# Patient Record
Sex: MALE | Race: BLACK OR AFRICAN AMERICAN | NOT HISPANIC OR LATINO | Employment: FULL TIME | URBAN - METROPOLITAN AREA
[De-identification: names, ages, dates, MRNs, and addresses within clinical notes are randomized per-mention and may not be internally consistent; named-entity substitution may affect disease eponyms.]

---

## 2019-07-20 ENCOUNTER — HOSPITAL ENCOUNTER (INPATIENT)
Facility: HOSPITAL | Age: 34
LOS: 1 days | Discharge: HOME | DRG: 683 | End: 2019-07-21
Attending: EMERGENCY MEDICINE | Admitting: EMERGENCY MEDICINE
Payer: MEDICAID

## 2019-07-20 DIAGNOSIS — N17.9 AKI (ACUTE KIDNEY INJURY): Primary | ICD-10-CM

## 2019-07-20 DIAGNOSIS — E86.0 DEHYDRATION: ICD-10-CM

## 2019-07-20 DIAGNOSIS — N30.01 ACUTE CYSTITIS WITH HEMATURIA: ICD-10-CM

## 2019-07-20 DIAGNOSIS — E87.5 HYPERKALEMIA: ICD-10-CM

## 2019-07-20 PROBLEM — R73.9 HYPERGLYCEMIA: Status: ACTIVE | Noted: 2019-07-20

## 2019-07-20 PROBLEM — E66.9 OBESITY: Status: ACTIVE | Noted: 2019-07-20

## 2019-07-20 PROBLEM — M62.82 RHABDOMYOLYSIS: Status: ACTIVE | Noted: 2019-07-20

## 2019-07-20 PROBLEM — E83.52 HYPERCALCEMIA: Status: ACTIVE | Noted: 2019-07-20

## 2019-07-20 LAB
ALBUMIN SERPL BCP-MCNC: 5.1 G/DL (ref 3.5–5.2)
ALP SERPL-CCNC: 144 U/L (ref 55–135)
ALT SERPL W/O P-5'-P-CCNC: 35 U/L (ref 10–44)
ANION GAP SERPL CALC-SCNC: 19 MMOL/L (ref 8–16)
AST SERPL-CCNC: 24 U/L (ref 10–40)
BACTERIA #/AREA URNS HPF: ABNORMAL /HPF
BASOPHILS # BLD AUTO: 0 K/UL (ref 0–0.2)
BASOPHILS NFR BLD: 0 % (ref 0–1.9)
BILIRUB SERPL-MCNC: 0.4 MG/DL (ref 0.1–1)
BILIRUB UR QL STRIP: NEGATIVE
BUN SERPL-MCNC: 39 MG/DL (ref 6–20)
CALCIUM SERPL-MCNC: 11.3 MG/DL (ref 8.7–10.5)
CHLORIDE SERPL-SCNC: 94 MMOL/L (ref 95–110)
CHLORIDE UR-SCNC: <20 MMOL/L (ref 25–200)
CK SERPL-CCNC: 397 U/L (ref 20–200)
CLARITY UR: ABNORMAL
CO2 SERPL-SCNC: 22 MMOL/L (ref 23–29)
COLOR UR: YELLOW
CREAT SERPL-MCNC: 3.8 MG/DL (ref 0.5–1.4)
CREAT UR-MCNC: 341.9 MG/DL (ref 23–375)
DIFFERENTIAL METHOD: ABNORMAL
EOSINOPHIL # BLD AUTO: 0 K/UL (ref 0–0.5)
EOSINOPHIL NFR BLD: 0 % (ref 0–8)
ERYTHROCYTE [DISTWIDTH] IN BLOOD BY AUTOMATED COUNT: 14 % (ref 11.5–14.5)
EST. GFR  (AFRICAN AMERICAN): 23 ML/MIN/1.73 M^2
EST. GFR  (NON AFRICAN AMERICAN): 19 ML/MIN/1.73 M^2
GLUCOSE SERPL-MCNC: 129 MG/DL (ref 70–110)
GLUCOSE UR QL STRIP: NEGATIVE
GRAN CASTS #/AREA URNS LPF: 3 /LPF
HCT VFR BLD AUTO: 50.8 % (ref 40–54)
HGB BLD-MCNC: 17 G/DL (ref 14–18)
HGB UR QL STRIP: ABNORMAL
HYALINE CASTS #/AREA URNS LPF: 40 /LPF
KETONES UR QL STRIP: NEGATIVE
LEUKOCYTE ESTERASE UR QL STRIP: ABNORMAL
LYMPHOCYTES # BLD AUTO: 1.3 K/UL (ref 1–4.8)
LYMPHOCYTES NFR BLD: 10.1 % (ref 18–48)
MAGNESIUM SERPL-MCNC: 2.7 MG/DL (ref 1.6–2.6)
MCH RBC QN AUTO: 28.7 PG (ref 27–31)
MCHC RBC AUTO-ENTMCNC: 33.5 G/DL (ref 32–36)
MCV RBC AUTO: 86 FL (ref 82–98)
MICROSCOPIC COMMENT: ABNORMAL
MONOCYTES # BLD AUTO: 0.9 K/UL (ref 0.3–1)
MONOCYTES NFR BLD: 7.1 % (ref 4–15)
NEUTROPHILS # BLD AUTO: 10.7 K/UL (ref 1.8–7.7)
NEUTROPHILS NFR BLD: 83 % (ref 38–73)
NITRITE UR QL STRIP: NEGATIVE
PH UR STRIP: 5 [PH] (ref 5–8)
PLATELET # BLD AUTO: 302 K/UL (ref 150–350)
PMV BLD AUTO: 10.6 FL (ref 9.2–12.9)
POTASSIUM SERPL-SCNC: 5.2 MMOL/L (ref 3.5–5.1)
POTASSIUM UR-SCNC: 89 MMOL/L (ref 15–95)
PROT SERPL-MCNC: 11.1 G/DL (ref 6–8.4)
PROT UR QL STRIP: ABNORMAL
RBC # BLD AUTO: 5.92 M/UL (ref 4.6–6.2)
RBC #/AREA URNS HPF: 12 /HPF (ref 0–4)
SODIUM SERPL-SCNC: 135 MMOL/L (ref 136–145)
SODIUM UR-SCNC: 71 MMOL/L (ref 20–250)
SP GR UR STRIP: 1.01 (ref 1–1.03)
UNIDENT CRYS URNS QL MICRO: ABNORMAL
URATE CRY URNS QL MICRO: ABNORMAL
URN SPEC COLLECT METH UR: ABNORMAL
UROBILINOGEN UR STRIP-ACNC: NEGATIVE EU/DL
WBC # BLD AUTO: 12.91 K/UL (ref 3.9–12.7)
WBC #/AREA URNS HPF: 35 /HPF (ref 0–5)

## 2019-07-20 PROCEDURE — 93005 ELECTROCARDIOGRAM TRACING: CPT

## 2019-07-20 PROCEDURE — 82436 ASSAY OF URINE CHLORIDE: CPT

## 2019-07-20 PROCEDURE — 81000 URINALYSIS NONAUTO W/SCOPE: CPT

## 2019-07-20 PROCEDURE — 93010 ELECTROCARDIOGRAM REPORT: CPT | Mod: ,,, | Performed by: INTERNAL MEDICINE

## 2019-07-20 PROCEDURE — 63600175 PHARM REV CODE 636 W HCPCS: Performed by: NURSE PRACTITIONER

## 2019-07-20 PROCEDURE — 25000003 PHARM REV CODE 250: Performed by: NURSE PRACTITIONER

## 2019-07-20 PROCEDURE — 85025 COMPLETE CBC W/AUTO DIFF WBC: CPT

## 2019-07-20 PROCEDURE — 11000001 HC ACUTE MED/SURG PRIVATE ROOM

## 2019-07-20 PROCEDURE — 82570 ASSAY OF URINE CREATININE: CPT

## 2019-07-20 PROCEDURE — 80053 COMPREHEN METABOLIC PANEL: CPT

## 2019-07-20 PROCEDURE — 93010 EKG 12-LEAD: ICD-10-PCS | Mod: ,,, | Performed by: INTERNAL MEDICINE

## 2019-07-20 PROCEDURE — 36415 COLL VENOUS BLD VENIPUNCTURE: CPT

## 2019-07-20 PROCEDURE — 87086 URINE CULTURE/COLONY COUNT: CPT

## 2019-07-20 PROCEDURE — 83036 HEMOGLOBIN GLYCOSYLATED A1C: CPT

## 2019-07-20 PROCEDURE — 87491 CHLMYD TRACH DNA AMP PROBE: CPT

## 2019-07-20 PROCEDURE — 82550 ASSAY OF CK (CPK): CPT

## 2019-07-20 PROCEDURE — 96374 THER/PROPH/DIAG INJ IV PUSH: CPT

## 2019-07-20 PROCEDURE — 96375 TX/PRO/DX INJ NEW DRUG ADDON: CPT

## 2019-07-20 PROCEDURE — 84300 ASSAY OF URINE SODIUM: CPT

## 2019-07-20 PROCEDURE — 84133 ASSAY OF URINE POTASSIUM: CPT

## 2019-07-20 PROCEDURE — 99285 EMERGENCY DEPT VISIT HI MDM: CPT | Mod: 25

## 2019-07-20 PROCEDURE — 96361 HYDRATE IV INFUSION ADD-ON: CPT

## 2019-07-20 PROCEDURE — 83735 ASSAY OF MAGNESIUM: CPT

## 2019-07-20 PROCEDURE — 63600175 PHARM REV CODE 636 W HCPCS: Performed by: INTERNAL MEDICINE

## 2019-07-20 RX ORDER — SODIUM CHLORIDE 9 MG/ML
INJECTION, SOLUTION INTRAVENOUS CONTINUOUS
Status: DISCONTINUED | OUTPATIENT
Start: 2019-07-20 | End: 2019-07-21 | Stop reason: HOSPADM

## 2019-07-20 RX ORDER — ACETAMINOPHEN 325 MG/1
650 TABLET ORAL EVERY 8 HOURS PRN
Status: DISCONTINUED | OUTPATIENT
Start: 2019-07-20 | End: 2019-07-21 | Stop reason: HOSPADM

## 2019-07-20 RX ORDER — MORPHINE SULFATE 10 MG/ML
4 INJECTION INTRAMUSCULAR; INTRAVENOUS; SUBCUTANEOUS EVERY 4 HOURS PRN
Status: DISCONTINUED | OUTPATIENT
Start: 2019-07-20 | End: 2019-07-21 | Stop reason: HOSPADM

## 2019-07-20 RX ORDER — ONDANSETRON 2 MG/ML
4 INJECTION INTRAMUSCULAR; INTRAVENOUS EVERY 8 HOURS PRN
Status: DISCONTINUED | OUTPATIENT
Start: 2019-07-20 | End: 2019-07-21 | Stop reason: HOSPADM

## 2019-07-20 RX ORDER — ONDANSETRON 2 MG/ML
4 INJECTION INTRAMUSCULAR; INTRAVENOUS
Status: COMPLETED | OUTPATIENT
Start: 2019-07-20 | End: 2019-07-20

## 2019-07-20 RX ORDER — ENOXAPARIN SODIUM 100 MG/ML
30 INJECTION SUBCUTANEOUS EVERY 24 HOURS
Status: DISCONTINUED | OUTPATIENT
Start: 2019-07-20 | End: 2019-07-21 | Stop reason: HOSPADM

## 2019-07-20 RX ORDER — SODIUM CHLORIDE 0.9 % (FLUSH) 0.9 %
10 SYRINGE (ML) INJECTION
Status: DISCONTINUED | OUTPATIENT
Start: 2019-07-20 | End: 2019-07-21 | Stop reason: HOSPADM

## 2019-07-20 RX ORDER — MORPHINE SULFATE 10 MG/ML
2 INJECTION INTRAMUSCULAR; INTRAVENOUS; SUBCUTANEOUS EVERY 4 HOURS PRN
Status: DISCONTINUED | OUTPATIENT
Start: 2019-07-20 | End: 2019-07-21 | Stop reason: HOSPADM

## 2019-07-20 RX ORDER — MORPHINE SULFATE 10 MG/ML
4 INJECTION INTRAMUSCULAR; INTRAVENOUS; SUBCUTANEOUS
Status: COMPLETED | OUTPATIENT
Start: 2019-07-20 | End: 2019-07-20

## 2019-07-20 RX ORDER — FAMOTIDINE 20 MG/1
20 TABLET, FILM COATED ORAL DAILY
Status: DISCONTINUED | OUTPATIENT
Start: 2019-07-20 | End: 2019-07-21 | Stop reason: HOSPADM

## 2019-07-20 RX ADMIN — SODIUM CHLORIDE: 0.9 INJECTION, SOLUTION INTRAVENOUS at 02:07

## 2019-07-20 RX ADMIN — SODIUM CHLORIDE 1000 ML: 0.9 INJECTION, SOLUTION INTRAVENOUS at 04:07

## 2019-07-20 RX ADMIN — SODIUM CHLORIDE: 0.9 INJECTION, SOLUTION INTRAVENOUS at 09:07

## 2019-07-20 RX ADMIN — SODIUM CHLORIDE: 0.9 INJECTION, SOLUTION INTRAVENOUS at 08:07

## 2019-07-20 RX ADMIN — FAMOTIDINE 20 MG: 20 TABLET ORAL at 10:07

## 2019-07-20 RX ADMIN — ENOXAPARIN SODIUM 30 MG: 100 INJECTION SUBCUTANEOUS at 04:07

## 2019-07-20 RX ADMIN — ONDANSETRON 4 MG: 2 INJECTION INTRAMUSCULAR; INTRAVENOUS at 04:07

## 2019-07-20 RX ADMIN — SODIUM CHLORIDE 1000 ML: 0.9 INJECTION, SOLUTION INTRAVENOUS at 05:07

## 2019-07-20 RX ADMIN — MORPHINE SULFATE 4 MG: 10 INJECTION INTRAVENOUS at 04:07

## 2019-07-20 NOTE — ED NOTES
Report received From Kody Nieves in room aaox3, resting comfortable. NAD noted will continue to monitor

## 2019-07-20 NOTE — HOSPITAL COURSE
Mr. Eldridge is a 33 yo M who presents to the ED with a CC of headache, N/V as well as muscle cramps for one day.  He worked out in the sun all day yesterday during this current heat wave. He presents to the ED and is found to be in acute renal failure with a CRT of 3.8. The patient otherwise has no complaints and states he feels better. He was started on aggressive fluid resuscitation.  By the following morning, his acute renal failure had resolved. The patient will be discharged to home today. All labs now normal. Activity as tolerated. Diet- regular. Follow up as needed.

## 2019-07-20 NOTE — NURSING
Pt aaox4 free of falls or injuries. Pt denies pain, n/v. He is able to ambulate independently in room. Iv fluids infusing as ordered. Pt refused denia hose. Call light w/i reach, bed in lowest position.

## 2019-07-20 NOTE — SUBJECTIVE & OBJECTIVE
History reviewed. No pertinent past medical history.    History reviewed. No pertinent surgical history.    Review of patient's allergies indicates:  No Known Allergies    No current facility-administered medications on file prior to encounter.      No current outpatient medications on file prior to encounter.     Family History     None        Tobacco Use    Smoking status: Current Every Day Smoker     Types: Cigarettes   Substance and Sexual Activity    Alcohol use: Yes    Drug use: Not Currently    Sexual activity: Not on file     Review of Systems   Constitutional: Positive for fatigue. Negative for activity change, appetite change, chills, diaphoresis and fever.   HENT: Negative for congestion.    Respiratory: Negative for cough, choking, chest tightness and shortness of breath.    Cardiovascular: Negative for chest pain, palpitations and leg swelling.   Gastrointestinal: Positive for nausea. Negative for abdominal distention, abdominal pain, anal bleeding, blood in stool, constipation and diarrhea.   Genitourinary: Negative for difficulty urinating and dysuria.   Musculoskeletal: Negative for arthralgias.   Neurological: Positive for headaches.   Psychiatric/Behavioral: Positive for agitation and behavioral problems.     Objective:     Vital Signs (Most Recent):  Temp: 97.5 °F (36.4 °C) (07/20/19 0813)  Pulse: 72 (07/20/19 0813)  Resp: 18 (07/20/19 0813)  BP: (!) 139/92 (07/20/19 0813)  SpO2: 98 % (07/20/19 0813) Vital Signs (24h Range):  Temp:  [97.5 °F (36.4 °C)-98.2 °F (36.8 °C)] 97.5 °F (36.4 °C)  Pulse:  [71-96] 72  Resp:  [16-18] 18  SpO2:  [97 %-99 %] 98 %  BP: (132-142)/(85-95) 139/92     Weight: 94.8 kg (209 lb)  Body mass index is 33.73 kg/m².    Physical Exam   Constitutional: He is oriented to person, place, and time. He appears well-developed and well-nourished.   HENT:   Head: Normocephalic and atraumatic.   Cardiovascular: Normal rate and regular rhythm.   Pulmonary/Chest: Effort normal and  breath sounds normal. No stridor. No respiratory distress. He has no wheezes. He has no rales.   Abdominal: Soft. Bowel sounds are normal. He exhibits no distension and no mass. There is no tenderness. There is no rebound and no guarding.   Neurological: He is alert and oriented to person, place, and time.   Skin: Skin is warm and dry.   Psychiatric: He has a normal mood and affect. His behavior is normal.   Nursing note and vitals reviewed.          Significant Labs:   BMP:   Recent Labs   Lab 07/20/19  0443   *   *   K 5.2*   CL 94*   CO2 22*   BUN 39*   CREATININE 3.8*   CALCIUM 11.3*   MG 2.7*     CBC:   Recent Labs   Lab 07/20/19  0443   WBC 12.91*   HGB 17.0   HCT 50.8          Significant Imaging:

## 2019-07-20 NOTE — HPI
Mr. Eldridge is a 33 yo M who presents to the ED with a CC of headache, N/V as well as muscle cramps for one day.  He worked out in the sun all day yesterday during this current heat wave. He presents to the ED and is found to be in acute renal failure with a CRT of 3.8. The patient otherwise has no complaints and states he feels better. He was started on aggressive fluid resuscitation.

## 2019-07-20 NOTE — ED TRIAGE NOTES
Pt cc Headache Pt has a frontal headache that is constant since yesterday after cutting grass. Sensitive to noise. Pt also c/o n/v

## 2019-07-20 NOTE — H&P
[facility] Hospital Medicine  History & Physical    Patient Name: Farhan Eldridge  MRN: [id number]  Admission Date: 7/20/2019  Attending Physician: Joey Reilly MD   Primary Care Provider: Primary Doctor No         Patient information was obtained from patient and ER records.     Subjective:     Principal Problem:DANILO (acute kidney injury)    Chief Complaint:   Chief Complaint   Patient presents with    Headache     Frontal headache that is constant since yesterday after cutting grass. Sensitive to noise. +n/v         HPI: Mr. Eldridge is a 35 yo M who presents to the ED with a CC of headache, N/V as well as muscle cramps for one day.  He worked out in the sun all day yesterday during this current heat wave. He presents to the ED and is found to be in acute renal failure with a CRT of 3.8. The patient otherwise has no complaints and states he feels better. He was started on aggressive fluid resuscitation.      History reviewed. No pertinent past medical history.    History reviewed. No pertinent surgical history.    Review of patient's allergies indicates:  No Known Allergies    No current facility-administered medications on file prior to encounter.      No current outpatient medications on file prior to encounter.     Family History     None        Tobacco Use    Smoking status: Current Every Day Smoker     Types: Cigarettes   Substance and Sexual Activity    Alcohol use: Yes    Drug use: Not Currently    Sexual activity: Not on file     Review of Systems   Constitutional: Positive for fatigue. Negative for activity change, appetite change, chills, diaphoresis and fever.   HENT: Negative for congestion.    Respiratory: Negative for cough, choking, chest tightness and shortness of breath.    Cardiovascular: Negative for chest pain, palpitations and leg swelling.   Gastrointestinal: Positive for nausea. Negative for abdominal distention, abdominal pain, anal bleeding, blood in stool,  constipation and diarrhea.   Genitourinary: Negative for difficulty urinating and dysuria.   Musculoskeletal: Negative for arthralgias.   Neurological: Positive for headaches.   Psychiatric/Behavioral: Positive for agitation and behavioral problems.     Objective:     Vital Signs (Most Recent):  Temp: 97.5 °F (36.4 °C) (07/20/19 0813)  Pulse: 72 (07/20/19 0813)  Resp: 18 (07/20/19 0813)  BP: (!) 139/92 (07/20/19 0813)  SpO2: 98 % (07/20/19 0813) Vital Signs (24h Range):  Temp:  [97.5 °F (36.4 °C)-98.2 °F (36.8 °C)] 97.5 °F (36.4 °C)  Pulse:  [71-96] 72  Resp:  [16-18] 18  SpO2:  [97 %-99 %] 98 %  BP: (132-142)/(85-95) 139/92     Weight: 94.8 kg (209 lb)  Body mass index is 33.73 kg/m².    Physical Exam   Constitutional: He is oriented to person, place, and time. He appears well-developed and well-nourished.   HENT:   Head: Normocephalic and atraumatic.   Cardiovascular: Normal rate and regular rhythm.   Pulmonary/Chest: Effort normal and breath sounds normal. No stridor. No respiratory distress. He has no wheezes. He has no rales.   Abdominal: Soft. Bowel sounds are normal. He exhibits no distension and no mass. There is no tenderness. There is no rebound and no guarding.   Neurological: He is alert and oriented to person, place, and time.   Skin: Skin is warm and dry.   Psychiatric: He has a normal mood and affect. His behavior is normal.   Nursing note and vitals reviewed.          Significant Labs:   BMP:   Recent Labs   Lab 07/20/19 0443   *   *   K 5.2*   CL 94*   CO2 22*   BUN 39*   CREATININE 3.8*   CALCIUM 11.3*   MG 2.7*     CBC:   Recent Labs   Lab 07/20/19 0443   WBC 12.91*   HGB 17.0   HCT 50.8          Significant Imaging:    Assessment/Plan:     * DANILO (acute kidney injury)  From heat exposure/dehydration. IV fluids with NS at 200/hr.  Should resolve by tomorrow.         Obesity  Body mass index is 33.73 kg/m².  Weight loss as out patient       Hypercalcemia  Likely from volume  contraction. Will repeat in am.         Hyperglycemia  Will check an A1c. May be from stress.      Hyperkalemia  Minimal and should correct with IV fluids       Rhabdomyolysis  Minimal with CPK at 300+  Will repeat in am.           VTE Risk Mitigation (From admission, onward)        Ordered     IP VTE HIGH RISK PATIENT  Once      07/20/19 0806     Place KELLY hose  Until discontinued      07/20/19 0806             Joey Ramos MD  Department of Hospital Medicine   [facility]

## 2019-07-21 VITALS
DIASTOLIC BLOOD PRESSURE: 89 MMHG | BODY MASS INDEX: 33.59 KG/M2 | SYSTOLIC BLOOD PRESSURE: 164 MMHG | HEIGHT: 66 IN | OXYGEN SATURATION: 99 % | RESPIRATION RATE: 18 BRPM | TEMPERATURE: 98 F | HEART RATE: 57 BPM | WEIGHT: 209 LBS

## 2019-07-21 LAB
ALBUMIN SERPL BCP-MCNC: 3.2 G/DL (ref 3.5–5.2)
ALP SERPL-CCNC: 85 U/L (ref 55–135)
ALT SERPL W/O P-5'-P-CCNC: 31 U/L (ref 10–44)
ANION GAP SERPL CALC-SCNC: 6 MMOL/L (ref 8–16)
AST SERPL-CCNC: 30 U/L (ref 10–40)
BASOPHILS # BLD AUTO: 0 K/UL (ref 0–0.2)
BASOPHILS NFR BLD: 0 % (ref 0–1.9)
BILIRUB SERPL-MCNC: 0.2 MG/DL (ref 0.1–1)
BUN SERPL-MCNC: 23 MG/DL (ref 6–20)
CALCIUM SERPL-MCNC: 8.8 MG/DL (ref 8.7–10.5)
CHLORIDE SERPL-SCNC: 108 MMOL/L (ref 95–110)
CO2 SERPL-SCNC: 27 MMOL/L (ref 23–29)
CREAT SERPL-MCNC: 1.2 MG/DL (ref 0.5–1.4)
DIFFERENTIAL METHOD: ABNORMAL
EOSINOPHIL # BLD AUTO: 0.1 K/UL (ref 0–0.5)
EOSINOPHIL NFR BLD: 1.4 % (ref 0–8)
ERYTHROCYTE [DISTWIDTH] IN BLOOD BY AUTOMATED COUNT: 14.4 % (ref 11.5–14.5)
EST. GFR  (AFRICAN AMERICAN): >60 ML/MIN/1.73 M^2
EST. GFR  (NON AFRICAN AMERICAN): >60 ML/MIN/1.73 M^2
ESTIMATED AVG GLUCOSE: 120 MG/DL (ref 68–131)
GLUCOSE SERPL-MCNC: 95 MG/DL (ref 70–110)
HBA1C MFR BLD HPLC: 5.8 % (ref 4–5.6)
HCT VFR BLD AUTO: 40.6 % (ref 40–54)
HGB BLD-MCNC: 13 G/DL (ref 14–18)
LYMPHOCYTES # BLD AUTO: 2.4 K/UL (ref 1–4.8)
LYMPHOCYTES NFR BLD: 38.8 % (ref 18–48)
MCH RBC QN AUTO: 28.2 PG (ref 27–31)
MCHC RBC AUTO-ENTMCNC: 32 G/DL (ref 32–36)
MCV RBC AUTO: 88 FL (ref 82–98)
MONOCYTES # BLD AUTO: 0.7 K/UL (ref 0.3–1)
MONOCYTES NFR BLD: 10.7 % (ref 4–15)
NEUTROPHILS # BLD AUTO: 3.1 K/UL (ref 1.8–7.7)
NEUTROPHILS NFR BLD: 49.4 % (ref 38–73)
PLATELET # BLD AUTO: 238 K/UL (ref 150–350)
PMV BLD AUTO: 10.3 FL (ref 9.2–12.9)
POTASSIUM SERPL-SCNC: 4.8 MMOL/L (ref 3.5–5.1)
PROT SERPL-MCNC: 6.8 G/DL (ref 6–8.4)
RBC # BLD AUTO: 4.61 M/UL (ref 4.6–6.2)
SODIUM SERPL-SCNC: 141 MMOL/L (ref 136–145)
TROPONIN I SERPL DL<=0.01 NG/ML-MCNC: <0.006 NG/ML (ref 0–0.03)
WBC # BLD AUTO: 6.24 K/UL (ref 3.9–12.7)

## 2019-07-21 PROCEDURE — 36415 COLL VENOUS BLD VENIPUNCTURE: CPT

## 2019-07-21 PROCEDURE — 93010 EKG 12-LEAD: ICD-10-PCS | Mod: ,,, | Performed by: INTERNAL MEDICINE

## 2019-07-21 PROCEDURE — 25000003 PHARM REV CODE 250: Performed by: NURSE PRACTITIONER

## 2019-07-21 PROCEDURE — 63600175 PHARM REV CODE 636 W HCPCS: Performed by: NURSE PRACTITIONER

## 2019-07-21 PROCEDURE — 84484 ASSAY OF TROPONIN QUANT: CPT

## 2019-07-21 PROCEDURE — 80053 COMPREHEN METABOLIC PANEL: CPT

## 2019-07-21 PROCEDURE — 93005 ELECTROCARDIOGRAM TRACING: CPT

## 2019-07-21 PROCEDURE — 93010 ELECTROCARDIOGRAM REPORT: CPT | Mod: ,,, | Performed by: INTERNAL MEDICINE

## 2019-07-21 PROCEDURE — 85025 COMPLETE CBC W/AUTO DIFF WBC: CPT

## 2019-07-21 RX ADMIN — ONDANSETRON 4 MG: 2 INJECTION INTRAMUSCULAR; INTRAVENOUS at 01:07

## 2019-07-21 RX ADMIN — SODIUM CHLORIDE: 0.9 INJECTION, SOLUTION INTRAVENOUS at 08:07

## 2019-07-21 RX ADMIN — FAMOTIDINE 20 MG: 20 TABLET ORAL at 08:07

## 2019-07-21 RX ADMIN — MORPHINE SULFATE 4 MG: 10 INJECTION INTRAVENOUS at 01:07

## 2019-07-21 NOTE — PROGRESS NOTES
[facility]    WRITTEN HEALTHCARE AND DISCHARGE INFORMATION  Follow-up Information     [facility].    Why:  Pt will contact [facility] to schedule a hospital f/u appointment.  Contact information:  [facility] [address]  [phone]                 Help at Home   [phone]  After discharge for assistance [facility] On Call Nurse Care Line 24/7  Assistance     Things You are responsible For To Manage Your Care At Home:  1.    Getting your prescriptions filled   2.    Taking your medications as directed, DO NOT MISS ANY DOSES!  3.    Going to your follow-up doctor appointment. This is important because it  allow the doctor to monitor your progress and determine if  any changes need to made to your treatment plan.     Thank you for choosing [facility] for your care.  Please answer any calls you may receive from [facility] we want to continue to support you as you manage your healthcare needs. [facility] is happy to have the opportunity to serve you.      Sincerely,  Your [facility] Team,  Brianda CURRAN,SW6   [phone]

## 2019-07-21 NOTE — PLAN OF CARE
"SW met with patient to complete discharge needs assessment. SW reviewed with patient contents of "[facility] Packet" including "help at home", "things patient responsible for to manage his health at home" and "preferences". Patient was able to verbalize his help at home is Natasha Bryant. SW discussed with patient the things he's responsible for to manage his health at home would be by going on his doctor appointments, taking medications as prescribed, and getting prescriptions filled. SW wrote name and phone number on white communication board. Patient has no preference when attending appointments.    PCP- No PCP on file so pt will [facility]    Extended Emergency Contact Information  Primary Emergency Contact: natasha bryant  Mobile Phone: [phone]  Relation: Significant other  Preferred language: English   needed? No     07/21/19 1238   Discharge Assessment   Assessment Type Discharge Planning Assessment   Confirmed/corrected address and phone number on facesheet? Yes   Assessment information obtained from? Patient   Prior to hospitilization cognitive status: Alert/Oriented   Prior to hospitalization functional status: Independent   Current cognitive status: Alert/Oriented   Current Functional Status: Independent   Facility Arrived From: Pt drive self to hospital   Lives With spouse;child(lilia), adult;child(lilia), dependent   Able to Return to Prior Arrangements yes   Is patient able to care for self after discharge? Yes   Who are your caregiver(s) and their phone number(s)? Izabel (wife) [phone]   Patient's perception of discharge disposition home or selfcare   Readmission Within the Last 30 Days no previous admission in last 30 days   Patient currently being followed by outpatient case management? No   Patient currently receives any other outside agency services? No   Equipment Currently Used at Home none   Do you have any problems affording any of " your prescribed medications?   (Pt is not currenlty prescribed any medication.)   Is the patient taking medications as prescribed?   (Pt is not currenlty prescribed any medicaiton.)   Does the patient have transportation home? Yes   Transportation Anticipated family or friend will provide   Does the patient receive services at the [facility]? No   Discharge Plan A Home with family   DME Needed Upon Discharge  none   Patient/Family in Agreement with Plan yes

## 2019-07-21 NOTE — NURSING
AAOx4. Free of injuries and falls, IVF infusing as ordered. Afebrile throughout night. Wife at bedside. Will continue to monitor. SR^x3, call light within reach, bed low and locked.

## 2019-07-21 NOTE — PLAN OF CARE
"SW provided with educational information on Heat Exhaustation.  Information reviewed and placed in :My Healthcare Packet" to be brought home for him to use as resource after discharge.  Information included:  signs and symptoms to look for and call the doctor if experiencing, and symptoms that may indicate a medical emergency: CALL 911.         07/21/19 1348   Final Note   Assessment Type Final Discharge Note   Anticipated Discharge Disposition Home   What phone number can be called within the next 1-3 days to see how you are doing after discharge? [phone]   Hospital Follow Up  Appt(s) scheduled? Yes   Discharge plans and expectations educations in teach back method with documentation complete? Yes     "

## 2019-07-21 NOTE — ASSESSMENT & PLAN NOTE
From heat exposure/dehydration. IV fluids with NS at 200/hr.  Should resolve by tomorrow.   Resolved.

## 2019-07-21 NOTE — DISCHARGE SUMMARY
[facility] Hospital Medicine  Discharge Summary      Patient Name: Farhan Eldridge  MRN: [id number]  Admission Date: 7/20/2019  Hospital Length of Stay: 1 days  Discharge Date and Time:  07/21/2019 9:36 AM  Attending Physician: Joey Reilly MD   Discharging Provider: Joey Reilly MD  Primary Care Provider: Primary Doctor No      HPI:   Mr. Eldridge is a 33 yo M who presents to the ED with a CC of headache, N/V as well as muscle cramps for one day.  He worked out in the sun all day yesterday during this current heat wave. He presents to the ED and is found to be in acute renal failure with a CRT of 3.8. The patient otherwise has no complaints and states he feels better. He was started on aggressive fluid resuscitation.      * No surgery found *      Hospital Course:   Mr. Eldridge is a 33 yo M who presents to the ED with a CC of headache, N/V as well as muscle cramps for one day.  He worked out in the sun all day yesterday during this current heat wave. He presents to the ED and is found to be in acute renal failure with a CRT of 3.8. The patient otherwise has no complaints and states he feels better. He was started on aggressive fluid resuscitation.  By the following morning, his acute renal failure had resolved. The patient will be discharged to home today. All labs now normal. Activity as tolerated. Diet- regular. Follow up as needed.          Consults:     No new Assessment & Plan notes have been filed under this hospital service since the last note was generated.  Service: Hospital Medicine    Final Active Diagnoses:    Diagnosis Date Noted POA    PRINCIPAL PROBLEM:  DANILO (acute kidney injury) [N17.9] 07/20/2019 Yes    Rhabdomyolysis [M62.82] 07/20/2019 Yes    Hyperkalemia [E87.5] 07/20/2019 Yes    Hyperglycemia [R73.9] 07/20/2019 Yes    Hypercalcemia [E83.52] 07/20/2019 Yes    Obesity [E66.9] 07/20/2019 Yes      Problems Resolved During this Admission:       Discharged  Condition: good    Disposition: Home or Self Care    Follow Up:    Patient Instructions:   No discharge procedures on file.    Significant Diagnostic Studies:    Pending Diagnostic Studies:     Procedure Component Value Units Date/Time    Hemoglobin A1c [[id number]] Collected:  07/20/19 1030    Order Status:  Sent Lab Status:  In process Updated:  07/20/19 1031    Specimen:  Blood          Medications:  Reconciled Home Medications:      Medication List      You have not been prescribed any medications.         Indwelling Lines/Drains at time of discharge:   Lines/Drains/Airways          None          Time spent on the discharge of patient:  < 30 minutes  Patient was seen and examined on the date of discharge and determined to be suitable for discharge.         Joey Ramos MD  Department of Hospital Medicine  [facility]

## 2019-07-21 NOTE — SUBJECTIVE & OBJECTIVE
Interval History: No new issues.       Review of Systems   Constitutional: Negative for activity change.   HENT: Negative for congestion.    Respiratory: Negative for chest tightness and shortness of breath.    Cardiovascular: Negative for chest pain.   Gastrointestinal: Negative for abdominal pain.   Genitourinary: Negative for difficulty urinating.     Objective:     Vital Signs (Most Recent):  Temp: 97.5 °F (36.4 °C) (07/21/19 0801)  Pulse: 62 (07/21/19 0801)  Resp: 18 (07/21/19 0801)  BP: 127/69 (07/21/19 0801)  SpO2: 96 % (07/21/19 0801) Vital Signs (24h Range):  Temp:  [97.5 °F (36.4 °C)-98.6 °F (37 °C)] 97.5 °F (36.4 °C)  Pulse:  [61-77] 62  Resp:  [18-19] 18  SpO2:  [96 %-98 %] 96 %  BP: (118-135)/(69-90) 127/69     Weight: 94.8 kg (209 lb)  Body mass index is 33.73 kg/m².    Intake/Output Summary (Last 24 hours) at 7/21/2019 0934  Last data filed at 7/21/2019 0851  Gross per 24 hour   Intake 1200 ml   Output --   Net 1200 ml      Physical Exam   Constitutional: He is oriented to person, place, and time. He appears well-developed and well-nourished.   HENT:   Head: Normocephalic and atraumatic.   Neurological: He is alert and oriented to person, place, and time.   Skin: Skin is warm and dry.   Psychiatric: He has a normal mood and affect. His behavior is normal.   Nursing note and vitals reviewed.      Significant Labs:   BMP:   Recent Labs   Lab 07/20/19  0443 07/21/19  0553   * 95   * 141   K 5.2* 4.8   CL 94* 108   CO2 22* 27   BUN 39* 23*   CREATININE 3.8* 1.2   CALCIUM 11.3* 8.8   MG 2.7*  --      CBC:   Recent Labs   Lab 07/20/19  0443 07/21/19  0553   WBC 12.91* 6.24   HGB 17.0 13.0*   HCT 50.8 40.6    238       Significant Imaging:

## 2019-07-21 NOTE — PROGRESS NOTES
[facility] Hospital Medicine  Progress Note    Patient Name: Farhan Eldridge  MRN: [id number]  Patient Class: IP- Inpatient   Admission Date: 7/20/2019  Length of Stay: 1 days  Attending Physician: Joey Reilly MD  Primary Care Provider: Primary Doctor No        Subjective:     Principal Problem:DANILO (acute kidney injury)      HPI:  Mr. Eldridge is a 35 yo M who presents to the ED with a CC of headache, N/V as well as muscle cramps for one day.  He worked out in the sun all day yesterday during this current heat wave. He presents to the ED and is found to be in acute renal failure with a CRT of 3.8. The patient otherwise has no complaints and states he feels better. He was started on aggressive fluid resuscitation.      Overview/Hospital Course:  Mr. Eldridge is a 35 yo M who presents to the ED with a CC of headache, N/V as well as muscle cramps for one day.  He worked out in the sun all day yesterday during this current heat wave. He presents to the ED and is found to be in acute renal failure with a CRT of 3.8. The patient otherwise has no complaints and states he feels better. He was started on aggressive fluid resuscitation.  By the following morning, his acute renal failure had resolved. The patient will be discharged to home today. All labs now normal. Activity as tolerated. Diet- regular. Follow up as needed.         Interval History: No new issues.       Review of Systems   Constitutional: Negative for activity change.   HENT: Negative for congestion.    Respiratory: Negative for chest tightness and shortness of breath.    Cardiovascular: Negative for chest pain.   Gastrointestinal: Negative for abdominal pain.   Genitourinary: Negative for difficulty urinating.     Objective:     Vital Signs (Most Recent):  Temp: 97.5 °F (36.4 °C) (07/21/19 0801)  Pulse: 62 (07/21/19 0801)  Resp: 18 (07/21/19 0801)  BP: 127/69 (07/21/19 0801)  SpO2: 96 % (07/21/19 0801) Vital Signs (24h  Range):  Temp:  [97.5 °F (36.4 °C)-98.6 °F (37 °C)] 97.5 °F (36.4 °C)  Pulse:  [61-77] 62  Resp:  [18-19] 18  SpO2:  [96 %-98 %] 96 %  BP: (118-135)/(69-90) 127/69     Weight: 94.8 kg (209 lb)  Body mass index is 33.73 kg/m².    Intake/Output Summary (Last 24 hours) at 7/21/2019 0934  Last data filed at 7/21/2019 0851  Gross per 24 hour   Intake 1200 ml   Output --   Net 1200 ml      Physical Exam   Constitutional: He is oriented to person, place, and time. He appears well-developed and well-nourished.   HENT:   Head: Normocephalic and atraumatic.   Neurological: He is alert and oriented to person, place, and time.   Skin: Skin is warm and dry.   Psychiatric: He has a normal mood and affect. His behavior is normal.   Nursing note and vitals reviewed.      Significant Labs:   BMP:   Recent Labs   Lab 07/20/19  0443 07/21/19  0553   * 95   * 141   K 5.2* 4.8   CL 94* 108   CO2 22* 27   BUN 39* 23*   CREATININE 3.8* 1.2   CALCIUM 11.3* 8.8   MG 2.7*  --      CBC:   Recent Labs   Lab 07/20/19  0443 07/21/19  0553   WBC 12.91* 6.24   HGB 17.0 13.0*   HCT 50.8 40.6    238       Significant Imaging:      Assessment/Plan:      * DANILO (acute kidney injury)  From heat exposure/dehydration. IV fluids with NS at 200/hr.  Should resolve by tomorrow.   Resolved.         Obesity  Body mass index is 33.73 kg/m².  Weight loss as out patient       Hypercalcemia  Likely from volume contraction. Will repeat in am.   Resolved         Hyperglycemia  Will check an A1c. May be from stress.  Resolved.      Hyperkalemia  Minimal and should correct with IV fluids   Resolved.      Rhabdomyolysis  Minimal with CPK at 300+  Will repeat in am.           VTE Risk Mitigation (From admission, onward)        Ordered     enoxaparin injection 30 mg  Daily      07/20/19 1018     IP VTE HIGH RISK PATIENT  Once      07/20/19 0806     Place KELLY hose  Until discontinued      07/20/19 0806          D/c to home.       Joey Ramos,  MD  Department of Hospital Medicine   [facility]

## 2019-07-22 LAB
BACTERIA UR CULT: NORMAL
C TRACH DNA SPEC QL NAA+PROBE: NOT DETECTED
N GONORRHOEA DNA SPEC QL NAA+PROBE: NOT DETECTED

## 2019-11-25 ENCOUNTER — HOSPITAL ENCOUNTER (EMERGENCY)
Facility: HOSPITAL | Age: 34
Discharge: HOME | End: 2019-11-25
Attending: EMERGENCY MEDICINE
Payer: MEDICAID

## 2019-11-25 ENCOUNTER — OFFICE VISIT (OUTPATIENT)
Dept: OPHTHALMOLOGY | Facility: CLINIC | Age: 34
End: 2019-11-25
Payer: MEDICAID

## 2019-11-25 VITALS
DIASTOLIC BLOOD PRESSURE: 82 MMHG | TEMPERATURE: 98 F | HEIGHT: 67 IN | WEIGHT: 190 LBS | HEART RATE: 66 BPM | BODY MASS INDEX: 29.82 KG/M2 | OXYGEN SATURATION: 97 % | SYSTOLIC BLOOD PRESSURE: 130 MMHG | RESPIRATION RATE: 17 BRPM

## 2019-11-25 DIAGNOSIS — S05.01XA: Primary | ICD-10-CM

## 2019-11-25 DIAGNOSIS — S05.01XA ABRASION OF RIGHT CORNEA, INITIAL ENCOUNTER: Primary | ICD-10-CM

## 2019-11-25 PROCEDURE — 92002 PR EYE EXAM, NEW PATIENT,INTERMED: ICD-10-PCS | Mod: S$PBB,,, | Performed by: OPHTHALMOLOGY

## 2019-11-25 PROCEDURE — 99999 PR PBB SHADOW E&M-EST. PATIENT-LVL II: ICD-10-PCS | Mod: PBBFAC,,, | Performed by: OPHTHALMOLOGY

## 2019-11-25 PROCEDURE — 99999 PR PBB SHADOW E&M-EST. PATIENT-LVL II: CPT | Mod: PBBFAC,,, | Performed by: OPHTHALMOLOGY

## 2019-11-25 PROCEDURE — 99284 EMERGENCY DEPT VISIT MOD MDM: CPT | Mod: 27

## 2019-11-25 PROCEDURE — 92002 INTRM OPH EXAM NEW PATIENT: CPT | Mod: S$PBB,,, | Performed by: OPHTHALMOLOGY

## 2019-11-25 PROCEDURE — 25000003 PHARM REV CODE 250: Performed by: PHYSICIAN ASSISTANT

## 2019-11-25 PROCEDURE — 99212 OFFICE O/P EST SF 10 MIN: CPT | Mod: PBBFAC | Performed by: OPHTHALMOLOGY

## 2019-11-25 RX ORDER — ERYTHROMYCIN 5 MG/G
OINTMENT OPHTHALMIC EVERY 6 HOURS
Qty: 1 TUBE | Refills: 0 | Status: SHIPPED | OUTPATIENT
Start: 2019-11-25 | End: 2019-11-30

## 2019-11-25 RX ORDER — PROPARACAINE HYDROCHLORIDE 5 MG/ML
1 SOLUTION/ DROPS OPHTHALMIC
Status: COMPLETED | OUTPATIENT
Start: 2019-11-25 | End: 2019-11-25

## 2019-11-25 RX ORDER — CYCLOPENTOLATE HYDROCHLORIDE 10 MG/ML
1 SOLUTION/ DROPS OPHTHALMIC 2 TIMES DAILY
Qty: 2 ML | Refills: 1 | Status: SHIPPED | OUTPATIENT
Start: 2019-11-25

## 2019-11-25 RX ORDER — POLYMYXIN B SULFATE AND TRIMETHOPRIM 1; 10000 MG/ML; [USP'U]/ML
1 SOLUTION OPHTHALMIC 4 TIMES DAILY
Qty: 1 BOTTLE | Refills: 1 | Status: SHIPPED | OUTPATIENT
Start: 2019-11-25

## 2019-11-25 RX ADMIN — PROPARACAINE HYDROCHLORIDE 1 DROP: 5 SOLUTION/ DROPS OPHTHALMIC at 08:11

## 2019-11-25 RX ADMIN — FLUORESCEIN SODIUM 1 EACH: 1 STRIP OPHTHALMIC at 08:11

## 2019-11-25 NOTE — ED PROVIDER NOTES
"Encounter Date: 11/25/2019    SCRIBE #1 NOTE: I, Amos Chatters, am scribing for, and in the presence of,  Trevon Gomez PA-C. I have scribed the following portions of the note - Other sections scribed: HPI, ROS, PE.       History     Chief Complaint   Patient presents with    Eye Problem     " Last night my little boy hit me in the eye (right) and it hurt". Pt reports sensitivity to light.      34 y.o M with no pertinent PMHx presents to the ED c/o acute onset of constant and severe (8/10) right eye pain which began after his son's finger hit his right eye x8 hours ago. He also reports an associated right side headache, right eye blurry vision and photophobia. He is unable to keep his eye open for more than a few seconds due to pain. He states "it feels like my eye is scratched." No alleviating or exacerbating factors. He does not wear contacts or glasses. He denies fever, chills, nausea, emesis, vision loss and any other associated symptoms. No prior tx.    The history is provided by the patient.     Review of patient's allergies indicates:  No Known Allergies  History reviewed. No pertinent past medical history.  History reviewed. No pertinent surgical history.  Family History   Problem Relation Age of Onset    Amblyopia Neg Hx     Blindness Neg Hx     Cataracts Neg Hx     Glaucoma Neg Hx     Macular degeneration Neg Hx     Retinal detachment Neg Hx     Strabismus Neg Hx      Social History     Tobacco Use    Smoking status: Current Every Day Smoker     Packs/day: 1.00     Types: Cigarettes    Smokeless tobacco: Never Used   Substance Use Topics    Alcohol use: Yes     Comment: occ    Drug use: Never     Review of Systems   Constitutional: Negative for chills and fever.   HENT: Negative for congestion and sore throat.    Eyes: Positive for photophobia (R), pain (R) and visual disturbance (R, blurry vision).        (-) vision loss   Respiratory: Negative for cough and shortness of breath.  "   Cardiovascular: Negative for chest pain.   Gastrointestinal: Negative for abdominal pain, nausea and vomiting.   Genitourinary: Negative for dysuria.   Skin: Negative for rash.   Allergic/Immunologic: Negative for immunocompromised state.   Neurological: Negative for headaches.       Physical Exam     Initial Vitals [11/25/19 0744]   BP Pulse Resp Temp SpO2   129/81 66 18 98.2 °F (36.8 °C) 97 %      MAP       --         Physical Exam    Nursing note and vitals reviewed.  Constitutional: He is not diaphoretic. No distress.   HENT:   Head: Normocephalic and atraumatic.   Mouth/Throat: Oropharynx is clear and moist.   Eyes: EOM are normal. Pupils are equal, round, and reactive to light. Right conjunctiva is injected. No scleral icterus.   Slit lamp exam:       The right eye shows corneal abrasion. The right eye shows no corneal ulcer.        The left eye shows no corneal abrasion and no corneal ulcer.   Neck: Normal range of motion. Neck supple. No JVD present.   Cardiovascular: Normal rate, regular rhythm and intact distal pulses.   Pulmonary/Chest: Breath sounds normal. No stridor. No respiratory distress.   Abdominal: Soft. Bowel sounds are normal. He exhibits no distension. There is no tenderness.   Musculoskeletal: Normal range of motion. He exhibits no edema or tenderness.   Neurological: He is alert and oriented to person, place, and time. He has normal strength. No cranial nerve deficit or sensory deficit.   Skin: Skin is warm and dry. No rash noted.   Psychiatric: He has a normal mood and affect.         ED Course   Procedures  Labs Reviewed - No data to display       Imaging Results    None          Medical Decision Making:   ED Management:  Hemodynamically stable. Non-toxic and in no acute distress. Right conjunctiva injected and abrasion to right cornea on woods lamp exam. Will d/c with prescription of erythryomycin ointment and arranged f/u with ophthalmology at 1:30 today at [facility]. Pt verbalizes  understanding and is agreeable with plan. Return instructions given.             Scribe Attestation:   Scribe #1: I performed the above scribed service and the documentation accurately describes the services I performed. I attest to the accuracy of the note.                          Clinical Impression:       ICD-10-CM ICD-9-CM   1. Abrasion of right cornea, initial encounter S05.01XA 918.1         Disposition:   Disposition: Discharged  Condition: Stable      I, Trevon Gomez, personally performed the services described in this documentation. All medical record entries made by the scribe were at my direction and in my presence.  I have reviewed the chart and agree that the record reflects my personal performance and is accurate and complete.               Trevon Gomez PA-C  11/25/19 5149

## 2019-11-25 NOTE — DISCHARGE INSTRUCTIONS
Please take new medication as directed. Please make sure to follow up with Ophthalmology to discuss today's Emergency Department visit and for further evaluation and management. Please return to the Emergency Department if your symptoms worsen or you develop any additional concerning symptoms.

## 2019-11-25 NOTE — PATIENT INSTRUCTIONS
Polytrim four times a day. Cyclopentolate twice a day. Bandage contact lens.  Dr. Donovan tomorrow.

## 2019-11-25 NOTE — ED TRIAGE NOTES
The pt states he was playing with his son and he poked him in the right eye around 2330 last night. Reports his right eye is red and watery. Denies fever at home.

## 2019-11-25 NOTE — PROGRESS NOTES
HPI     Urgent     Corneal abrasion OD    Patient states that on yesterday he was playing with his son and his   finger accidentally went into his right eye and underneath the lid. patine   sttaes his pain  Is at a 9.    I have personally interviewed the patient, reviewed the history and   examined the patient and agree with the technician's exam.      Last edited by Nghia Huerta MD on 11/25/2019  2:30 PM. (History)            Assessment /Plan     For exam results, see Encounter Report.    Injury of right conjunctiva and corneal abrasion, initial encounter  -     polymyxin B sulf-trimethoprim (POLYTRIM) 10,000 unit- 1 mg/mL Drop; Place 1 drop into the right eye 4 (four) times daily.  Dispense: 1 Bottle; Refill: 1  -     cyclopentolate 1% (CYCLOGYL) 1 % ophthalmic solution; Place 1 drop into the right eye 2 (two) times daily.  Dispense: 2 mL; Refill: 1      Mr. Eldridge has suffered a large abrasion of his right cornea and conjunctiva. I prescribed Polytrim four times a day. Cyclopentolate twice a day. Bandage contact lens.  Dr. Donovan tomorrow.

## 2019-11-26 ENCOUNTER — OFFICE VISIT (OUTPATIENT)
Dept: OPHTHALMOLOGY | Facility: CLINIC | Age: 34
End: 2019-11-26
Payer: MEDICAID

## 2019-11-26 DIAGNOSIS — S05.01XA: Primary | ICD-10-CM

## 2019-11-26 PROCEDURE — 99212 OFFICE O/P EST SF 10 MIN: CPT | Mod: PBBFAC | Performed by: OPHTHALMOLOGY

## 2019-11-26 PROCEDURE — 92014 PR EYE EXAM, EST PATIENT,COMPREHESV: ICD-10-PCS | Mod: S$PBB,,, | Performed by: OPHTHALMOLOGY

## 2019-11-26 PROCEDURE — 99999 PR PBB SHADOW E&M-EST. PATIENT-LVL II: CPT | Mod: PBBFAC,,, | Performed by: OPHTHALMOLOGY

## 2019-11-26 PROCEDURE — 92071 CONTACT LENS FITTING FOR TX: CPT | Mod: S$PBB,RT,, | Performed by: OPHTHALMOLOGY

## 2019-11-26 PROCEDURE — 99999 PR PBB SHADOW E&M-EST. PATIENT-LVL II: ICD-10-PCS | Mod: PBBFAC,,, | Performed by: OPHTHALMOLOGY

## 2019-11-26 PROCEDURE — 92071 PR CONTACT LENS FITTING FOR TX: ICD-10-PCS | Mod: S$PBB,RT,, | Performed by: OPHTHALMOLOGY

## 2019-11-26 PROCEDURE — 92071 CONTACT LENS FITTING FOR TX: CPT | Mod: PBBFAC | Performed by: OPHTHALMOLOGY

## 2019-11-26 PROCEDURE — 92014 COMPRE OPH EXAM EST PT 1/>: CPT | Mod: S$PBB,,, | Performed by: OPHTHALMOLOGY

## 2019-11-26 NOTE — PROGRESS NOTES
HPI     corneal abrasion      Additional comments: Ref. Dr. Huerta               Comments     Referred by: Dr. Huerta     Corneal Abrasion OD - 2/2 fingernail injury    Cyclogyl BID OD     Patient was seen yesterday for a Corneal Abrasion with Dr. Huerta. When   patient went to the pharmacy they had only given him the Cyclogyl and not   the Polytrim. He also reports that the bandage contact lens is still in   OD. He is complaining of blurred vision and some discomfort but not having   any pain, he feel like it is improving since his visit with Dr. Huerta.   Light sensitive, excessive tearing.           Last edited by Felicia Donovan MD on 11/26/2019  1:56 PM. (History)            Assessment /Plan     For exam results, see Encounter Report.    Injury of right conjunctiva and corneal abrasion, initial encounter      K abrasion with iritis OD  - replaced BCL today, moxi tid, cyclogyl QD    F/up Friday - va OD                  no

## 2019-11-29 ENCOUNTER — OFFICE VISIT (OUTPATIENT)
Dept: OPHTHALMOLOGY | Facility: CLINIC | Age: 34
End: 2019-11-29
Payer: MEDICAID

## 2019-11-29 DIAGNOSIS — H16.001 CORNEA ULCER, RIGHT: Primary | ICD-10-CM

## 2019-11-29 PROCEDURE — 99999 PR PBB SHADOW E&M-EST. PATIENT-LVL II: ICD-10-PCS | Mod: PBBFAC,,, | Performed by: OPHTHALMOLOGY

## 2019-11-29 PROCEDURE — 99999 PR PBB SHADOW E&M-EST. PATIENT-LVL II: CPT | Mod: PBBFAC,,, | Performed by: OPHTHALMOLOGY

## 2019-11-29 PROCEDURE — 99212 OFFICE O/P EST SF 10 MIN: CPT | Mod: PBBFAC | Performed by: OPHTHALMOLOGY

## 2019-11-29 PROCEDURE — 92014 COMPRE OPH EXAM EST PT 1/>: CPT | Mod: S$PBB,,, | Performed by: OPHTHALMOLOGY

## 2019-11-29 PROCEDURE — 92014 PR EYE EXAM, EST PATIENT,COMPREHESV: ICD-10-PCS | Mod: S$PBB,,, | Performed by: OPHTHALMOLOGY

## 2019-11-29 NOTE — PROGRESS NOTES
HPI     Ref. Dr. Huerta      Corneal Abrasion OD --> K  ulcer    Moxi TID OD  Cyclo QD OD    Pt states that vision is still blurry in OD for distance. Pt reports near   and intermediate vision is doing well. Pt denies any flashes or floaters   OU. No major pain but will have discomfort when watching TV from straining   the eye.       Last edited by Felicia Donovan MD on 11/29/2019  9:36 AM. (History)            Assessment /Plan     For exam results, see Encounter Report.    Cornea ulcer, right      K abrasion tx'ed with BCL and abx - now with new K ulcer    D/c bcl, incr moxi to q2hrs, okay to stop cycloplegia    F/up Monday - va OD

## 2019-12-02 ENCOUNTER — OFFICE VISIT (OUTPATIENT)
Dept: OPHTHALMOLOGY | Facility: CLINIC | Age: 34
End: 2019-12-02
Payer: MEDICAID

## 2019-12-02 DIAGNOSIS — H16.001 CORNEA ULCER, RIGHT: Primary | ICD-10-CM

## 2019-12-02 PROCEDURE — 92014 PR EYE EXAM, EST PATIENT,COMPREHESV: ICD-10-PCS | Mod: S$PBB,,, | Performed by: OPHTHALMOLOGY

## 2019-12-02 PROCEDURE — 99999 PR PBB SHADOW E&M-EST. PATIENT-LVL II: CPT | Mod: PBBFAC,,, | Performed by: OPHTHALMOLOGY

## 2019-12-02 PROCEDURE — 92014 COMPRE OPH EXAM EST PT 1/>: CPT | Mod: S$PBB,,, | Performed by: OPHTHALMOLOGY

## 2019-12-02 PROCEDURE — 99212 OFFICE O/P EST SF 10 MIN: CPT | Mod: PBBFAC | Performed by: OPHTHALMOLOGY

## 2019-12-02 PROCEDURE — 99999 PR PBB SHADOW E&M-EST. PATIENT-LVL II: ICD-10-PCS | Mod: PBBFAC,,, | Performed by: OPHTHALMOLOGY

## 2019-12-02 NOTE — PROGRESS NOTES
HPI     Follow-up      Additional comments: K ulcer OD               Comments     Ref. Dr. Huerta       Corneal Abrasion OD --> K  ulcer     Moxi q2hrs while awake    PT here for f/u k ulcer OD.  Pt states distance VA is still blurry. Denies   eye pain. Cannot see well driving. No other ocular complaints.               Last edited by Felicia Donovan MD on 12/2/2019  8:29 AM. (History)            Assessment /Plan     For exam results, see Encounter Report.    Cornea ulcer, right      Improved on frequent abx - okay to decr vigamox to 4-5x/day.  F/up 1 wk - va OD, ARx OD, Mrx OD

## 2020-01-06 ENCOUNTER — TELEPHONE (OUTPATIENT)
Dept: OPHTHALMOLOGY | Facility: CLINIC | Age: 35
End: 2020-01-06

## 2020-01-06 NOTE — TELEPHONE ENCOUNTER
Called pt no answer left message. Pt ok to see Dr. Donovan on 1/7/2020. Start or continue Moxi if pt has drop left

## 2020-01-06 NOTE — TELEPHONE ENCOUNTER
----- Message from Claudia Martin sent at 1/6/2020  1:08 PM CST -----  Contact: [facility]  OPHTHALMOLOGY TRIAGE CALL    Date:  1/6/2020   Time:  1:09 PM  Person Calling: Farhan Eldridge  Phone Number:  [phone] (home)   Call received by:  Claudia Martin  Patient in Clinic now:  No  Which eye:  Right  Name of Patient's Eye :  [place]  Date Last Seen: 12/02/2019  Disposition of patient:Pt's eye is painful, red, extreme sensiitivity to light, blurred vision.    For How Long:Today    Additional Comments:

## 2020-01-07 ENCOUNTER — HOSPITAL ENCOUNTER (EMERGENCY)
Facility: HOSPITAL | Age: 35
Discharge: HOME | End: 2020-01-07
Attending: EMERGENCY MEDICINE
Payer: MEDICAID

## 2020-01-07 VITALS
HEIGHT: 67 IN | BODY MASS INDEX: 29.03 KG/M2 | HEART RATE: 59 BPM | TEMPERATURE: 98 F | RESPIRATION RATE: 16 BRPM | DIASTOLIC BLOOD PRESSURE: 84 MMHG | WEIGHT: 185 LBS | SYSTOLIC BLOOD PRESSURE: 124 MMHG | OXYGEN SATURATION: 97 %

## 2020-01-07 DIAGNOSIS — H16.001 ULCER OF RIGHT CORNEA: Primary | ICD-10-CM

## 2020-01-07 PROCEDURE — 25000003 PHARM REV CODE 250: Performed by: PHYSICIAN ASSISTANT

## 2020-01-07 PROCEDURE — 99283 EMERGENCY DEPT VISIT LOW MDM: CPT

## 2020-01-07 RX ORDER — ACETAMINOPHEN 325 MG/1
650 TABLET ORAL
Status: COMPLETED | OUTPATIENT
Start: 2020-01-07 | End: 2020-01-07

## 2020-01-07 RX ORDER — IBUPROFEN 600 MG/1
600 TABLET ORAL
Status: COMPLETED | OUTPATIENT
Start: 2020-01-07 | End: 2020-01-07

## 2020-01-07 RX ORDER — TETRACAINE HYDROCHLORIDE 5 MG/ML
2 SOLUTION OPHTHALMIC
Status: COMPLETED | OUTPATIENT
Start: 2020-01-07 | End: 2020-01-07

## 2020-01-07 RX ORDER — MOXIFLOXACIN 5 MG/ML
1 SOLUTION/ DROPS OPHTHALMIC 3 TIMES DAILY
Qty: 3 ML | Refills: 0 | Status: SHIPPED | OUTPATIENT
Start: 2020-01-07

## 2020-01-07 RX ADMIN — FLUORESCEIN SODIUM 1 EACH: 1 STRIP OPHTHALMIC at 11:01

## 2020-01-07 RX ADMIN — TETRACAINE HYDROCHLORIDE 2 DROP: 5 SOLUTION OPHTHALMIC at 11:01

## 2020-01-07 RX ADMIN — ACETAMINOPHEN 650 MG: 325 TABLET ORAL at 12:01

## 2020-01-07 RX ADMIN — IBUPROFEN 600 MG: 600 TABLET, FILM COATED ORAL at 12:01

## 2020-01-07 NOTE — DISCHARGE INSTRUCTIONS
Call your ophthalmologist today, and schedule appointment for tomorrow or the next day for re-evaluation.

## 2020-01-07 NOTE — ED PROVIDER NOTES
"Encounter Date: 1/7/2020    SCRIBE #1 NOTE: I, Sushila Caldwell, am scribing for, and in the presence of,  Amrit Nunez PA-C. I have scribed the following portions of the note - Other sections scribed: HPI,ROS,PE.       History     Chief Complaint   Patient presents with    Eye Problem     Pt c/o right eye pain and drainage since Sunday. Pt work in a warehouse. Redness noted      CC: Eye pain    HPI: This is a 34 y.o.male patient, with no PMHx, presenting to the ED with a complaint of right eye pain, that began 3 days ago. Patient states "it feels like a piece of cotton is on top of my eyeball". Patient reports associated headaches, watery eyes, and blurred vision. He reports his pain is worse with light and movement of his head. Patient states he had an eye injury in the same eye 2 months ago and was treated with a contact. He states he has his follow up appointment for the injury soon. Patient reports attempting prior Tx with prescribed antibiotic eye drops, with no relief. Patient denies any eye discharge, or any other associated symptoms. No known drug allergies. Denies still wearing eye contacts.      The history is provided by the patient.     Review of patient's allergies indicates:  No Known Allergies  History reviewed. No pertinent past medical history.  History reviewed. No pertinent surgical history.  Family History   Problem Relation Age of Onset    Amblyopia Neg Hx     Blindness Neg Hx     Cataracts Neg Hx     Glaucoma Neg Hx     Macular degeneration Neg Hx     Retinal detachment Neg Hx     Strabismus Neg Hx      Social History     Tobacco Use    Smoking status: Current Every Day Smoker     Packs/day: 1.00     Types: Cigarettes    Smokeless tobacco: Never Used   Substance Use Topics    Alcohol use: Yes     Comment: occ    Drug use: Never     Review of Systems   Constitutional: Negative for chills and fever.   HENT: Negative for congestion, ear pain, rhinorrhea and sore throat.    Eyes: " Positive for photophobia, pain and visual disturbance (blurred). Negative for discharge.        +eye watering   Respiratory: Negative for cough and shortness of breath.    Cardiovascular: Negative for chest pain.   Gastrointestinal: Negative for abdominal pain, diarrhea, nausea and vomiting.   Genitourinary: Negative for dysuria.   Musculoskeletal: Negative for back pain and neck pain.   Skin: Negative for rash.   Neurological: Positive for headaches.       Physical Exam     Initial Vitals [01/07/20 1016]   BP Pulse Resp Temp SpO2   126/82 62 18 98.2 °F (36.8 °C) 97 %      MAP       --         Physical Exam    Nursing note and vitals reviewed.  Constitutional: He appears well-developed and well-nourished. No distress.   HENT:   Head: Normocephalic and atraumatic.   Right Ear: Tympanic membrane normal.   Left Ear: Tympanic membrane normal.   Nose: Nose normal.   Mouth/Throat: Uvula is midline, oropharynx is clear and moist and mucous membranes are normal.   Eyes: EOM and lids are normal. Pupils are equal, round, and reactive to light. Lids are everted and swept, no foreign bodies found. Right eye exhibits chemosis and discharge ( watery). Right eye exhibits no exudate and no hordeolum. No foreign body present in the right eye. Left eye exhibits no chemosis, no discharge, no exudate and no hordeolum. No foreign body present in the left eye. Right conjunctiva is injected. Right conjunctiva has no hemorrhage. Left conjunctiva is not injected. Left conjunctiva has no hemorrhage.   Slit lamp exam:       The right eye shows corneal ulcer and fluorescein uptake. The right eye shows no corneal flare, no foreign body, no hyphema, no hypopyon and no anterior chamber bulge.       Visual acuity:  OD 20/70, OS 20/50, both 20/50  Ocular pressures:  OD 25, OS 27   Neck: Normal range of motion. Neck supple.   Cardiovascular: Normal rate, regular rhythm and normal heart sounds.   Pulmonary/Chest: Effort normal. No respiratory  distress.   Musculoskeletal: Normal range of motion.   Neurological: He is alert and oriented to person, place, and time. He has normal strength. No cranial nerve deficit or sensory deficit.   Skin: Skin is warm and dry. Capillary refill takes less than 2 seconds.   Psychiatric: He has a normal mood and affect.         ED Course   Procedures  Labs Reviewed - No data to display       Imaging Results    None          Medical Decision Making:   ED Management:  34-year-old male with corneal ulcer to the right nasal aspect of the cornea.  Slight decrease in visual acuity.  No evidence of anterior chamber extension.  No foreign bodies identified.  Patient treated with antibiotic drops and instructed to follow up with Ophthalmology closely for re-evaluation.            Scribe Attestation:   Scribe #1: I performed the above scribed service and the documentation accurately describes the services I performed. I attest to the accuracy of the note.                          Clinical Impression:       ICD-10-CM ICD-9-CM   1. Ulcer of right cornea H16.001 370.00            Scribe attestation: I, Amrit Nunez, personally performed the services described in this documentation. All medical record entries made by the scribe were at my direction and in my presence.  I have reviewed the chart and agree that the record reflects my personal performance and is accurate and complete.                   Amrit Nunez, CHIN  01/07/20 5727

## 2020-01-10 ENCOUNTER — OFFICE VISIT (OUTPATIENT)
Dept: OPHTHALMOLOGY | Facility: CLINIC | Age: 35
End: 2020-01-10
Payer: MEDICAID

## 2020-01-10 DIAGNOSIS — H16.9 KERATITIS: Primary | ICD-10-CM

## 2020-01-10 PROCEDURE — 99999 PR PBB SHADOW E&M-EST. PATIENT-LVL II: CPT | Mod: PBBFAC,,, | Performed by: OPHTHALMOLOGY

## 2020-01-10 PROCEDURE — 92014 COMPRE OPH EXAM EST PT 1/>: CPT | Mod: S$PBB,,, | Performed by: OPHTHALMOLOGY

## 2020-01-10 PROCEDURE — 99999 PR PBB SHADOW E&M-EST. PATIENT-LVL II: ICD-10-PCS | Mod: PBBFAC,,, | Performed by: OPHTHALMOLOGY

## 2020-01-10 PROCEDURE — 92014 PR EYE EXAM, EST PATIENT,COMPREHESV: ICD-10-PCS | Mod: S$PBB,,, | Performed by: OPHTHALMOLOGY

## 2020-01-10 PROCEDURE — 99212 OFFICE O/P EST SF 10 MIN: CPT | Mod: PBBFAC | Performed by: OPHTHALMOLOGY

## 2020-01-10 RX ORDER — NEOMYCIN SULFATE, POLYMYXIN B SULFATE AND DEXAMETHASONE 3.5; 10000; 1 MG/ML; [USP'U]/ML; MG/ML
1 SUSPENSION/ DROPS OPHTHALMIC 4 TIMES DAILY
Qty: 5 ML | Refills: 0 | Status: SHIPPED | OUTPATIENT
Start: 2020-01-10 | End: 2020-01-20

## 2020-01-10 NOTE — PROGRESS NOTES
"HPI     Ref. Dr. Huerta       Corneal Abrasion OD --> K  ulcer OD    Moxi 4-5x daily *pt ran out*    Pt here for k abrasion f/u OD. Improved at last visit, however, symptoms   came back Monday this week. Pt states he cannot see distance still and   infringes on his driving. Feels like there is "cotton" or something on his   eye.  Denies eye pain, just constant blurriness.     Last edited by Felicia Donovan MD on 1/10/2020  1:56 PM. (History)            Assessment /Plan     For exam results, see Encounter Report.    Keratitis    Other orders  -     neomycin-polymyxin-dexamethasone (MAXITROL) 3.5mg/mL-10,000 unit/mL-0.1 % DrpS; Place 1 drop into the right eye 4 (four) times daily. for 10 days  Dispense: 5 mL; Refill: 0      Seen in past with Dr. Huerta - was dx'ed with K abrasion that got infected.    Now with multifocal sterile appearing infiltrates, more c/w inflammatory process.  Pt denies FB exposure, no CL wear.    Start maxitrol QID, then taper 3/2/1.      May benefit from oral steroids in future.    Pt going to establish care at [facility] department of Ophthalmology in next few weeks.                 "